# Patient Record
Sex: FEMALE | Race: BLACK OR AFRICAN AMERICAN | Employment: PART TIME | URBAN - METROPOLITAN AREA
[De-identification: names, ages, dates, MRNs, and addresses within clinical notes are randomized per-mention and may not be internally consistent; named-entity substitution may affect disease eponyms.]

---

## 2019-05-06 ENCOUNTER — APPOINTMENT (OUTPATIENT)
Dept: GENERAL RADIOLOGY | Age: 54
End: 2019-05-06
Attending: NURSE PRACTITIONER
Payer: MEDICAID

## 2019-05-06 ENCOUNTER — HOSPITAL ENCOUNTER (EMERGENCY)
Age: 54
Discharge: HOME | End: 2019-05-06
Attending: EMERGENCY MEDICINE
Payer: MEDICAID

## 2019-05-06 VITALS
HEART RATE: 84 BPM | WEIGHT: 293 LBS | BODY MASS INDEX: 53.92 KG/M2 | RESPIRATION RATE: 23 BRPM | OXYGEN SATURATION: 100 % | TEMPERATURE: 98.6 F | DIASTOLIC BLOOD PRESSURE: 81 MMHG | HEIGHT: 62 IN | SYSTOLIC BLOOD PRESSURE: 126 MMHG

## 2019-05-06 DIAGNOSIS — R07.9 CHEST PAIN, UNSPECIFIED TYPE: Primary | ICD-10-CM

## 2019-05-06 DIAGNOSIS — N30.00 ACUTE CYSTITIS WITHOUT HEMATURIA: ICD-10-CM

## 2019-05-06 DIAGNOSIS — R80.9 PROTEINURIA, UNSPECIFIED TYPE: ICD-10-CM

## 2019-05-06 LAB
ALBUMIN SERPL-MCNC: 3.6 G/DL (ref 3.4–5)
ALBUMIN/GLOB SERPL: 0.9 {RATIO} (ref 0.8–1.7)
ALP SERPL-CCNC: 101 U/L (ref 45–117)
ALT SERPL-CCNC: 29 U/L (ref 13–56)
ANION GAP SERPL CALC-SCNC: 5 MMOL/L (ref 3–18)
APPEARANCE UR: CLEAR
AST SERPL-CCNC: 21 U/L (ref 15–37)
BACTERIA URNS QL MICRO: ABNORMAL /HPF
BASOPHILS # BLD: 0 K/UL (ref 0–0.1)
BASOPHILS NFR BLD: 0 % (ref 0–2)
BILIRUB SERPL-MCNC: 0.4 MG/DL (ref 0.2–1)
BILIRUB UR QL: NEGATIVE
BUN SERPL-MCNC: 15 MG/DL (ref 7–18)
BUN/CREAT SERPL: 16 (ref 12–20)
CALCIUM SERPL-MCNC: 9.3 MG/DL (ref 8.5–10.1)
CHLORIDE SERPL-SCNC: 104 MMOL/L (ref 100–108)
CK MB CFR SERPL CALC: NORMAL % (ref 0–4)
CK MB CFR SERPL CALC: NORMAL % (ref 0–4)
CK MB SERPL-MCNC: <1 NG/ML (ref 5–25)
CK MB SERPL-MCNC: <1 NG/ML (ref 5–25)
CK SERPL-CCNC: 122 U/L (ref 26–192)
CK SERPL-CCNC: 146 U/L (ref 26–192)
CO2 SERPL-SCNC: 31 MMOL/L (ref 21–32)
COLOR UR: YELLOW
CREAT SERPL-MCNC: 0.96 MG/DL (ref 0.6–1.3)
D DIMER PPP FEU-MCNC: 0.46 UG/ML(FEU)
DIFFERENTIAL METHOD BLD: ABNORMAL
EOSINOPHIL # BLD: 0.2 K/UL (ref 0–0.4)
EOSINOPHIL NFR BLD: 2 % (ref 0–5)
EPITH CASTS URNS QL MICRO: ABNORMAL /LPF (ref 0–5)
ERYTHROCYTE [DISTWIDTH] IN BLOOD BY AUTOMATED COUNT: 15.3 % (ref 11.6–14.5)
GLOBULIN SER CALC-MCNC: 4.1 G/DL (ref 2–4)
GLUCOSE SERPL-MCNC: 86 MG/DL (ref 74–99)
GLUCOSE UR STRIP.AUTO-MCNC: NEGATIVE MG/DL
HCT VFR BLD AUTO: 42 % (ref 35–45)
HGB BLD-MCNC: 13.3 G/DL (ref 12–16)
HGB UR QL STRIP: NEGATIVE
KETONES UR QL STRIP.AUTO: NEGATIVE MG/DL
LEUKOCYTE ESTERASE UR QL STRIP.AUTO: ABNORMAL
LYMPHOCYTES # BLD: 2.1 K/UL (ref 0.9–3.6)
LYMPHOCYTES NFR BLD: 25 % (ref 21–52)
MCH RBC QN AUTO: 26.1 PG (ref 24–34)
MCHC RBC AUTO-ENTMCNC: 31.7 G/DL (ref 31–37)
MCV RBC AUTO: 82.5 FL (ref 74–97)
MONOCYTES # BLD: 0.4 K/UL (ref 0.05–1.2)
MONOCYTES NFR BLD: 5 % (ref 3–10)
NEUTS SEG # BLD: 5.8 K/UL (ref 1.8–8)
NEUTS SEG NFR BLD: 68 % (ref 40–73)
NITRITE UR QL STRIP.AUTO: NEGATIVE
PH UR STRIP: 5 [PH] (ref 5–8)
PLATELET # BLD AUTO: 290 K/UL (ref 135–420)
PMV BLD AUTO: 9.5 FL (ref 9.2–11.8)
POTASSIUM SERPL-SCNC: 3.9 MMOL/L (ref 3.5–5.5)
PROT SERPL-MCNC: 7.7 G/DL (ref 6.4–8.2)
PROT UR STRIP-MCNC: 30 MG/DL
RBC # BLD AUTO: 5.09 M/UL (ref 4.2–5.3)
RBC #/AREA URNS HPF: NEGATIVE /HPF (ref 0–5)
SODIUM SERPL-SCNC: 140 MMOL/L (ref 136–145)
SP GR UR REFRACTOMETRY: 1.02 (ref 1–1.03)
TROPONIN I SERPL-MCNC: <0.02 NG/ML (ref 0–0.04)
TROPONIN I SERPL-MCNC: <0.02 NG/ML (ref 0–0.04)
UROBILINOGEN UR QL STRIP.AUTO: 1 EU/DL (ref 0.2–1)
WBC # BLD AUTO: 8.5 K/UL (ref 4.6–13.2)
WBC URNS QL MICRO: ABNORMAL /HPF (ref 0–5)

## 2019-05-06 PROCEDURE — 99285 EMERGENCY DEPT VISIT HI MDM: CPT

## 2019-05-06 PROCEDURE — 81001 URINALYSIS AUTO W/SCOPE: CPT

## 2019-05-06 PROCEDURE — 71046 X-RAY EXAM CHEST 2 VIEWS: CPT

## 2019-05-06 PROCEDURE — 93005 ELECTROCARDIOGRAM TRACING: CPT

## 2019-05-06 PROCEDURE — 82550 ASSAY OF CK (CPK): CPT

## 2019-05-06 PROCEDURE — 85379 FIBRIN DEGRADATION QUANT: CPT

## 2019-05-06 PROCEDURE — 80053 COMPREHEN METABOLIC PANEL: CPT

## 2019-05-06 PROCEDURE — 74011250637 HC RX REV CODE- 250/637: Performed by: NURSE PRACTITIONER

## 2019-05-06 PROCEDURE — 85025 COMPLETE CBC W/AUTO DIFF WBC: CPT

## 2019-05-06 RX ORDER — NITROFURANTOIN 25; 75 MG/1; MG/1
100 CAPSULE ORAL 2 TIMES DAILY
Qty: 14 CAP | Refills: 0 | Status: SHIPPED | OUTPATIENT
Start: 2019-05-06 | End: 2019-05-13

## 2019-05-06 RX ORDER — GUAIFENESIN 100 MG/5ML
81 LIQUID (ML) ORAL
Status: COMPLETED | OUTPATIENT
Start: 2019-05-06 | End: 2019-05-06

## 2019-05-06 RX ORDER — ACETAMINOPHEN 325 MG/1
650 TABLET ORAL
Status: COMPLETED | OUTPATIENT
Start: 2019-05-06 | End: 2019-05-06

## 2019-05-06 RX ADMIN — ASPIRIN 81 MG 81 MG: 81 TABLET ORAL at 20:42

## 2019-05-06 RX ADMIN — ACETAMINOPHEN 650 MG: 325 TABLET ORAL at 20:42

## 2019-05-06 NOTE — ED PROVIDER NOTES
EMERGENCY DEPARTMENT HISTORY AND PHYSICAL EXAM 
 
Date: 5/6/2019 Patient Name: Nasrin Courtney History of Presenting Illness Chief Complaint Patient presents with  Chest Pain  Headache History Provided By: Patient Additional History (Context):  
6:33 PM 
Nasrin Courtney is a 46 y.o. female with PMHX diabetes, gout, hypertension, and sleep apnea presents to the ED c/o chest pain that started acutely around 2 PM this afternoon. Patient reports that she was walking back to her desk when she felt a sharp pain in her left chest that radiated down her left arm that lasted approximately 1 hour. She reports this pain was a 6 out of 10 and she did not take any medications prior to arrival.  She followed up with her primary care provider at 3:15 who instructed her to go to the emergency department. The patient reports that she was evaluated by a cardiologist via cardiac cath in 2015 but has not seen one since. Pt denies family history of premature cardiac death, long recent travel or hospitalization, leg pain or calf swelling, shortness of breath, family history of blood clots, personal history of blood clot, birth control, and any other sxs or complaints. PCP: Kat Ignacio MD 
 
Current Outpatient Medications Medication Sig Dispense Refill  nitrofurantoin, macrocrystal-monohydrate, (MACROBID) 100 mg capsule Take 1 Cap by mouth two (2) times a day for 7 days. 14 Cap 0  
 lisinopril-hydrochlorothiazide (PRINZIDE, ZESTORETIC) 20-25 mg per tablet TAKE ONE-HALF TABLET BY MOUTH TWICE DAILY 30 tablet 5  
 metformin (GLUCOPHAGE) 500 mg tablet Take 1 tablet by mouth two (2) times daily (with meals). 60 tablet 5  
 lovastatin (MEVACOR) 20 mg tablet Take 1 tablet by mouth nightly. 30 tablet 5  
 nystatin (MYCOSTATIN) topical cream Apply  to affected area two (2) times a day.  30 g 0  
 predniSONE (DELTASONE) 10 mg tablet Take 5 tabs by mouth daily for 5 days 25 Tab 0  
  triamcinolone acetonide (KENALOG) 0.1 % ointment Apply  to affected area two (2) times a day. use thin layer 80 g 2  
 ALBUTEROL SULFATE IN Take  by inhalation.  fluticasone (FLONASE) 50 mcg/actuation nasal spray nightly.  aspirin 81 mg tablet Take 81 mg by mouth.  multivitamin with iron tablet Take 1 Tab by mouth daily. Past History Past Medical History: 
Past Medical History:  
Diagnosis Date  Diabetes ([address]  Gout  Hypertension  Sleep apnea   
 cpap at night Past Surgical History: 
Past Surgical History:  
Procedure Laterality Date [address]  HX HEART CATHETERIZATION  2015 Family History: 
Family History Problem Relation Age of Onset  Breast Cancer Mother Social History: 
Social History Tobacco Use  Smoking status: Never Smoker  Smokeless tobacco: Never Used Substance Use Topics  Alcohol use: Yes Comment: \"rarely\"  Drug use: No  
 
 
Allergies: Allergies Allergen Reactions  Penicillins Swelling  Pollen Extracts Runny Nose Review of Systems Review of Systems Constitutional: Negative for chills and fever. HENT: Negative for sore throat. Respiratory: Negative for chest tightness and shortness of breath. Cardiovascular: Positive for chest pain. Negative for leg swelling. Gastrointestinal: Negative for abdominal pain, diarrhea, nausea and vomiting. Genitourinary: Negative for dysuria. Musculoskeletal: Negative for back pain. Skin: Negative for rash. Neurological: Negative for dizziness, syncope and numbness. Hematological: Does not bruise/bleed easily. All other systems reviewed and are negative. Physical Exam  
 
Vitals:  
 05/06/19 2000 05/06/19 2030 05/06/19 2108 05/06/19 2130 BP: 119/70 121/70 139/79 126/81 Pulse: 87 84 86 84 Resp: 17 20 23 23 Temp:  98.6 °F (37 °C) SpO2:  100% Weight:      
Height:      
 
Physical Exam  
 Constitutional: She is oriented to person, place, and time. She appears well-developed and well-nourished. Overweight -American female, no acute distress HENT:  
Head: Normocephalic and atraumatic. Eyes: Conjunctivae are normal.  
Neck: Normal range of motion. Neck supple. Cardiovascular: Normal rate and regular rhythm. Pulmonary/Chest: Effort normal and breath sounds normal. No respiratory distress. She has no wheezes. She has no rales. Abdominal: Soft. Bowel sounds are normal. There is no tenderness. There is no rebound and no guarding. Musculoskeletal: Normal range of motion. +1 bilateral lower extremity edema Neurological: She is alert and oriented to person, place, and time. Skin: Skin is warm and dry. Nursing note and vitals reviewed. Diagnostic Study Results Labs: 
  
Recent Results (from the past 12 hour(s)) EKG, 12 LEAD, INITIAL Collection Time: 05/06/19  6:11 PM  
Result Value Ref Range Ventricular Rate 81 BPM  
 Atrial Rate 81 BPM  
 P-R Interval 172 ms QRS Duration 96 ms  
 Q-T Interval 390 ms QTC Calculation (Bezet) 453 ms Calculated P Axis 56 degrees Calculated R Axis -13 degrees Calculated T Axis 61 degrees Diagnosis Normal sinus rhythm Normal ECG No previous ECGs available URINALYSIS W/ RFLX MICROSCOPIC Collection Time: 05/06/19  6:45 PM  
Result Value Ref Range Color YELLOW Appearance CLEAR Specific gravity 1.020 1.005 - 1.030    
 pH (UA) 5.0 5.0 - 8.0 Protein 30 (A) NEG mg/dL Glucose NEGATIVE  NEG mg/dL Ketone NEGATIVE  NEG mg/dL Bilirubin NEGATIVE  NEG Blood NEGATIVE  NEG Urobilinogen 1.0 0.2 - 1.0 EU/dL Nitrites NEGATIVE  NEG Leukocyte Esterase MODERATE (A) NEG URINE MICROSCOPIC ONLY Collection Time: 05/06/19  6:45 PM  
Result Value Ref Range WBC 6 to 10 0 - 5 /hpf  
 RBC NEGATIVE  0 - 5 /hpf Epithelial cells 1+ 0 - 5 /lpf  Bacteria 1+ (A) NEG /hpf  
 CBC WITH AUTOMATED DIFF Collection Time: 05/06/19  7:15 PM  
Result Value Ref Range WBC 8.5 4.6 - 13.2 K/uL  
 RBC 5.09 4. 20 - 5.30 M/uL  
 HGB 13.3 12.0 - 16.0 g/dL HCT 42.0 35.0 - 45.0 % MCV 82.5 74.0 - 97.0 FL  
 MCH 26.1 24.0 - 34.0 PG  
 MCHC 31.7 31.0 - 37.0 g/dL  
 RDW 15.3 (H) 11.6 - 14.5 % PLATELET 295 763 - 346 K/uL MPV 9.5 9.2 - 11.8 FL  
 NEUTROPHILS 68 40 - 73 % LYMPHOCYTES 25 21 - 52 % MONOCYTES 5 3 - 10 % EOSINOPHILS 2 0 - 5 % BASOPHILS 0 0 - 2 %  
 ABS. NEUTROPHILS 5.8 1.8 - 8.0 K/UL  
 ABS. LYMPHOCYTES 2.1 0.9 - 3.6 K/UL  
 ABS. MONOCYTES 0.4 0.05 - 1.2 K/UL  
 ABS. EOSINOPHILS 0.2 0.0 - 0.4 K/UL  
 ABS. BASOPHILS 0.0 0.0 - 0.1 K/UL  
 DF AUTOMATED METABOLIC PANEL, COMPREHENSIVE Collection Time: 05/06/19  7:15 PM  
Result Value Ref Range Sodium 140 136 - 145 mmol/L Potassium 3.9 3.5 - 5.5 mmol/L Chloride 104 100 - 108 mmol/L  
 CO2 31 21 - 32 mmol/L Anion gap 5 3.0 - 18 mmol/L Glucose 86 74 - 99 mg/dL BUN 15 7.0 - 18 MG/DL Creatinine 0.96 0.6 - 1.3 MG/DL  
 BUN/Creatinine ratio 16 12 - 20 GFR est AA >60 >60 ml/min/1.73m2 GFR est non-AA >60 >60 ml/min/1.73m2 Calcium 9.3 8.5 - 10.1 MG/DL Bilirubin, total 0.4 0.2 - 1.0 MG/DL  
 ALT (SGPT) 29 13 - 56 U/L  
 AST (SGOT) 21 15 - 37 U/L Alk. phosphatase 101 45 - 117 U/L Protein, total 7.7 6.4 - 8.2 g/dL Albumin 3.6 3.4 - 5.0 g/dL Globulin 4.1 (H) 2.0 - 4.0 g/dL A-G Ratio 0.9 0.8 - 1.7 CARDIAC PANEL,(CK, CKMB & TROPONIN) Collection Time: 05/06/19  7:15 PM  
Result Value Ref Range  26 - 192 U/L  
 CK - MB <1.0 <3.6 ng/ml CK-MB Index  0.0 - 4.0 % CALCULATION NOT PERFORMED WHEN RESULT IS BELOW LINEAR LIMIT Troponin-I, QT <0.02 0.0 - 0.045 NG/ML  
D DIMER Collection Time: 05/06/19  7:15 PM  
Result Value Ref Range D DIMER 0.46 (H) <0.46 ug/ml(FEU) CARDIAC PANEL,(CK, CKMB & TROPONIN)  Collection Time: 05/06/19  8:45 PM  
 Result Value Ref Range  26 - 192 U/L  
 CK - MB <1.0 <3.6 ng/ml CK-MB Index  0.0 - 4.0 % CALCULATION NOT PERFORMED WHEN RESULT IS BELOW LINEAR LIMIT Troponin-I, QT <0.02 0.0 - 0.045 NG/ML  
EKG, 12 LEAD, SUBSEQUENT Collection Time: 05/06/19  8:51 PM  
Result Value Ref Range Ventricular Rate 88 BPM  
 Atrial Rate 88 BPM  
 P-R Interval 166 ms  
 QRS Duration 98 ms Q-T Interval 396 ms QTC Calculation (Bezet) 479 ms Calculated P Axis 63 degrees Calculated R Axis -15 degrees Calculated T Axis 78 degrees Diagnosis Normal sinus rhythm Prolonged QT Abnormal ECG When compared with ECG of 06-MAY-2019 18:11, No significant change was found Radiologic Studies:  
 
6:33 PM 
RADIOLOGY FINDINGS Chest X-ray shows NAP Read by Judyth Fleming FNCLOVIS-BC Pending review by Radiologist 
 
XR CHEST PA LAT    (Results Pending) CT Results  (Last 48 hours) None CXR Results  (Last 48 hours) None Medical Decision Making I am the first provider for this patient. I reviewed the vital signs, available nursing notes, past medical history, past surgical history, family history and social history. Vital Signs: Reviewed the patient's vital signs. Pulse Oximetry Analysis: 99% on RA Cardiac Monitor: 
Rate: NSR bpm 
Rhythm: 84 EKG interpretation: (Preliminary) 6:33 PM  
Normal sinus rhythm, no STEMI, ventricular rate 81, QTc 453 EKG read by Judyth Keys FNCLOVIS-BC at 18:11 EKG interpretation repeat Normal sinus rhythm, no STEMI, ventricular rate 88, QTc 479 Read by Judyth Fleming FNP-BC 20:51 Records Reviewed: REVIEWED OLD RECORDS AND NURSING NOTES Procedures: 
Procedures ED Course: 
6:33 PM: Initial Contact 8:34 PM: Updated on all results. Urinalysis pending. She continues to deny any chest pain. Will order second set of enzymes and discharge if negative as patient does not wish to be admitted for further evaluation. 9:39 PM: Patient updated on all results. She states she feels much better and continues to not have any chest pain. She understands reasons to return does not wish for admission and is agreeable to treatment plan. Provider Notes (Medical Decision Making): She presents emergency department complaining of episode of chest pain that started acutely at 12 PM this afternoon. Age-adjusted d-dimer is negative and patient has a heart score of 3.  2 sets of cardiac enzymes are negative and EKG does not show any significant ischemia. Offered patient admission due to risk factors but she would rather follow-up with cardiology outpatient. Discharge with early PCP, cardiology follow-up and strict return precautions. Urine shows probable UTI was sent for culture we will treat with Macrobid. Pt understands reasons to return and is offering no questions or concerns. Diagnosis and Disposition Discharge Note: 
9:39 PM: 
Ronnie Tierney's  results have been reviewed with her. She has been counseled regarding her diagnosis, treatment, and plan. She verbally conveys understanding and agreement of the signs, symptoms, diagnosis, treatment and prognosis and additionally agrees to follow up as discussed. She also agrees with the care-plan and conveys that all of her questions have been answered. I have also provided discharge instructions for her that include: educational information regarding their diagnosis and treatment, and list of reasons why they would want to return to the ED prior to their follow-up appointment, should her condition change. She has been provided with education for proper emergency department utilization. Clinical Impression: 1. Chest pain, unspecified type 2. Acute cystitis without hematuria 3. Proteinuria, unspecified type Plan: 
1. D/C Home 2. Discharge Medication List as of 5/6/2019  9:39 PM  
  
START taking these medications Details nitrofurantoin, macrocrystal-monohydrate, (MACROBID) 100 mg capsule Take 1 Cap by mouth two (2) times a day for 7 days. , Print, Disp-14 Cap, R-0  
  
  
CONTINUE these medications which have NOT CHANGED Details  
lisinopril-hydrochlorothiazide (PRINZIDE, ZESTORETIC) 20-25 mg per tablet TAKE ONE-HALF TABLET BY MOUTH TWICE DAILY, Normal, Disp-30 tablet, R-5  
  
metformin (GLUCOPHAGE) 500 mg tablet Take 1 tablet by mouth two (2) times daily (with meals). , Normal, Disp-60 tablet, R-5  
  
lovastatin (MEVACOR) 20 mg tablet Take 1 tablet by mouth nightly., Normal, Disp-30 tablet, R-5  
  
nystatin (MYCOSTATIN) topical cream Apply  to affected area two (2) times a day., Normal, Disp-30 g, R-0  
  
predniSONE (DELTASONE) 10 mg tablet Take 5 tabs by mouth daily for 5 days, Normal, Disp-25 Tab, R-0  
  
triamcinolone acetonide (KENALOG) 0.1 % ointment Apply  to affected area two (2) times a day. use thin layer, Normal, Disp-80 g, R-2  
  
ALBUTEROL SULFATE IN Take  by inhalation. , Historical Med  
  
fluticasone (FLONASE) 50 mcg/actuation nasal spray nightly., Historical Med  
  
aspirin 81 mg tablet Take 81 mg by mouth., Historical Med  
  
multivitamin with iron tablet Take 1 Tab by mouth daily. , Historical Med 3. Follow-up Information Follow up With Specialties Details Why Contact Info Tiago Jarvis MD Internal Medicine Schedule an appointment as soon as possible for a visit in 1 day or your pcp [address] [place] [address] [address] [place] [address] 
[phone] Ho Sim MD Cardiology, Internal Medicine Schedule an appointment as soon as possible for a visit in 1 day  [address] [facility] [address] [facility] 
[phone] [facility] EMERGENCY DEPT Emergency Medicine  As needed, If symptoms worsen Susu Ordonez [facility] [address] 
[phone] Please note that this dictation was completed with [email or web address], the [employer] voice recognition software. Quite often unanticipated grammatical, syntax, homophones, and other interpretive errors are inadvertently transcribed by the computer software. Please disregard these errors. Please excuse any errors that have escaped final proofreading.  
 
MARCY Goel-BC

## 2019-05-06 NOTE — ED TRIAGE NOTES
Patient ambulatory into ER c/o chest discomfort (described as a dull ache) that began at 1230 today. Pt reports a headache began shortly after the chest pain began.

## 2019-05-07 NOTE — DISCHARGE INSTRUCTIONS
Follow-up as directed  Take medications as prescribed  Increase oral fluid intake  Return to emergency department for increased pain, shortness of breath, dizziness, sweating, vomiting or worsening of symptoms    Patient Education        Chest Pain: Care Instructions  Your Care Instructions    There are many things that can cause chest pain. Some are not serious and will get better on their own in a few days. But some kinds of chest pain need more testing and treatment. Your doctor may have recommended a follow-up visit in the next 8 to 12 hours. If you are not getting better, you may need more tests or treatment. Even though your doctor has released you, you still need to watch for any problems. The doctor carefully checked you, but sometimes problems can develop later. If you have new symptoms or if your symptoms do not get better, get medical care right away. If you have worse or different chest pain or pressure that lasts more than 5 minutes or you passed out (lost consciousness), call 911 or seek other emergency help right away. A medical visit is only one step in your treatment. Even if you feel better, you still need to do what your doctor recommends, such as going to all suggested follow-up appointments and taking medicines exactly as directed. This will help you recover and help prevent future problems. How can you care for yourself at home? · Rest until you feel better. · Take your medicine exactly as prescribed. Call your doctor if you think you are having a problem with your medicine. · Do not drive after taking a prescription pain medicine. When should you call for help? Call 911 if:    · You passed out (lost consciousness).     · You have severe difficulty breathing.     · You have symptoms of a heart attack. These may include:  ? Chest pain or pressure, or a strange feeling in your chest.  ? Sweating. ? Shortness of breath. ? Nausea or vomiting.   ? Pain, pressure, or a strange feeling in your back, neck, jaw, or upper belly or in one or both shoulders or arms. ? Lightheadedness or sudden weakness. ? A fast or irregular heartbeat. After you call 911, the  may tell you to chew 1 adult-strength or 2 to 4 low-dose aspirin. Wait for an ambulance. Do not try to drive yourself.    Call your doctor today if:    · You have any trouble breathing.     · Your chest pain gets worse.     · You are dizzy or lightheaded, or you feel like you may faint.     · You are not getting better as expected.     · You are having new or different chest pain. Where can you learn more? Go to http://mackenzieViOptixkelli.info/. Enter A120 in the search box to learn more about \"Chest Pain: Care Instructions. \"  Current as of: September 23, 2018  Content Version: 11.9  © 1558-1724 [employer]. Care instructions adapted under license by [employer] (which disclaims liability or warranty for this information). If you have questions about a medical condition or this instruction, always ask your healthcare professional. [place] [address] any warranty or liability for your use of this information. Patient Education        Urinary Tract Infection in Women: Care Instructions  Your Care Instructions    A urinary tract infection, or UTI, is a general term for an infection anywhere between the kidneys and the urethra (where urine comes out). Most UTIs are bladder infections. They often cause pain or burning when you urinate. UTIs are caused by bacteria and can be cured with antibiotics. Be sure to complete your treatment so that the infection goes away. Follow-up care is a key part of your treatment and safety. Be sure to make and go to all appointments, and call your doctor if you are having problems. It's also a good idea to know your test results and keep a list of the medicines you take. How can you care for yourself at home? · Take your antibiotics as directed. Do not stop taking them just because you feel better. You need to take the full course of antibiotics. · Drink extra water and other fluids for the next day or two. This may help wash out the bacteria that are causing the infection. (If you have kidney, heart, or liver disease and have to limit fluids, talk with your doctor before you increase your fluid intake.)  · Avoid drinks that are carbonated or have caffeine. They can irritate the bladder. · Urinate often. Try to empty your bladder each time. · To relieve pain, take a hot bath or lay a heating pad set on low over your lower belly or genital area. Never go to sleep with a heating pad in place. To prevent UTIs  · Drink plenty of water each day. This helps you urinate often, which clears bacteria from your system. (If you have kidney, heart, or liver disease and have to limit fluids, talk with your doctor before you increase your fluid intake.)  · Urinate when you need to. · Urinate right after you have sex. · Change sanitary pads often. · Avoid douches, bubble baths, feminine hygiene sprays, and other feminine hygiene products that have deodorants. · After going to the bathroom, wipe from front to back. When should you call for help? Call your doctor now or seek immediate medical care if:    · Symptoms such as fever, chills, nausea, or vomiting get worse or appear for the first time.     · You have new pain in your back just below your rib cage. This is called flank pain.     · There is new blood or pus in your urine.     · You have any problems with your antibiotic medicine.    Watch closely for changes in your health, and be sure to contact your doctor if:    · You are not getting better after taking an antibiotic for 2 days.     · Your symptoms go away but then come back. Where can you learn more? Go to http://macknezie-kelli.info/.   Enter C554 in the search box to learn more about \"Urinary Tract Infection in Women: Care Instructions. \"  Current as of: March 20, 2018  Content Version: 11.9  © 1498-7237 [employer], [facility]. Care instructions adapted under license by [employer] (which disclaims liability or warranty for this information). If you have questions about a medical condition or this instruction, always ask your healthcare professional. [place] [address] any warranty or liability for your use of this information.

## 2019-05-08 ENCOUNTER — HOSPITAL ENCOUNTER (OUTPATIENT)
Dept: ULTRASOUND IMAGING | Age: 54
Discharge: HOME | End: 2019-05-08
Attending: NURSE PRACTITIONER
Payer: MEDICAID

## 2019-05-08 DIAGNOSIS — K42.9 UMBILICAL HERNIA: ICD-10-CM

## 2019-05-08 DIAGNOSIS — R10.815 PERIUMBILIC ABDOMINAL TENDERNESS: ICD-10-CM

## 2019-05-08 PROCEDURE — 76700 US EXAM ABDOM COMPLETE: CPT

## 2019-05-11 LAB
ATRIAL RATE: 81 BPM
ATRIAL RATE: 88 BPM
CALCULATED P AXIS, ECG09: 56 DEGREES
CALCULATED P AXIS, ECG09: 63 DEGREES
CALCULATED R AXIS, ECG10: -13 DEGREES
CALCULATED R AXIS, ECG10: -15 DEGREES
CALCULATED T AXIS, ECG11: 61 DEGREES
CALCULATED T AXIS, ECG11: 78 DEGREES
DIAGNOSIS, 93000: NORMAL
DIAGNOSIS, 93000: NORMAL
P-R INTERVAL, ECG05: 166 MS
P-R INTERVAL, ECG05: 172 MS
Q-T INTERVAL, ECG07: 390 MS
Q-T INTERVAL, ECG07: 396 MS
QRS DURATION, ECG06: 96 MS
QRS DURATION, ECG06: 98 MS
QTC CALCULATION (BEZET), ECG08: 453 MS
QTC CALCULATION (BEZET), ECG08: 479 MS
VENTRICULAR RATE, ECG03: 81 BPM
VENTRICULAR RATE, ECG03: 88 BPM

## 2022-04-13 ENCOUNTER — HOSPITAL ENCOUNTER (OUTPATIENT)
Dept: NUTRITION | Age: 57
End: 2022-04-13
Payer: MEDICAID

## 2022-04-14 ENCOUNTER — APPOINTMENT (OUTPATIENT)
Dept: NUTRITION | Age: 57
End: 2022-04-14
Payer: MEDICAID

## 2022-04-20 ENCOUNTER — APPOINTMENT (OUTPATIENT)
Dept: NUTRITION | Age: 57
End: 2022-04-20
Payer: MEDICAID

## 2022-04-27 ENCOUNTER — HOSPITAL ENCOUNTER (OUTPATIENT)
Dept: NUTRITION | Age: 57
Discharge: HOME | End: 2022-04-27
Payer: MEDICAID

## 2022-04-27 PROCEDURE — 97802 MEDICAL NUTRITION INDIV IN: CPT

## 2022-04-27 NOTE — PROGRESS NOTES
[address] [place] [address]  [address] [facility], [address] [facility]  Phone: [phone] Fax: [phone]   Nutrition Assessment - Medical Nutrition Therapy   Outpatient Initial Evaluation         Patient Name: Cassius Arreaga : 1965   Treatment Diagnosis: Morbid (severe) obesity due to excess calories, Body mass index (BMI) 50.0-59.9, adult   Referral Source: Ana Staples NP Start of Care [facility]): 2022     Gender: female Age: 61 y.o. Ht: 62 in Wt: 320  lb  kg   BMI: 58.5 BMR   Male  BMR Female      Past Medical History:  Diabetes, Depression, High Blood Pressure, Stroke, Constipation      Pertinent Medications:   Includes: Metformin, Lisinopril, Carvedilol, Atorvastatin, Vitamin D, Calcium, Aspirin      Biochemical Data:        Assessment:    Patient present to learn about nutrition related to weight loss and diabetes. Patient reports that she has been gaining weight for the past 2 years. Patient does not check blood sugars at home. Patient works 2-3 days per week. Patient eats most meals outside of the home and does not enjoy cooking. Patient attends PT 2 days per week. Patient is unable to recall most recent A1-C. Food & Nutrition: 24 hour recall:  Breakfast: PBJ sandwich, banana  Lunch: turkey sandwich- Edmund Johns   Dinner: pasta with [employer] form [employer]   Drinks: [employer] zero, diet soda   Snacks: chips, nuts, ice cream        Estimate Needs   Calories:  2130 Protein: 133 Carbs: 240 Fat: 71   Kcal/day  g/day  g/day  g/day        percent: 25  45  30               Nutrition Diagnosis   Obesity related to excessive carbohydrate intake as evidenced by 24 hour recall of carbohydrate dense food (large sandwich roll, ice cream, pasta- large portion). Nutrition Intervention &  Education: Educated patient on the need for a consistent carbohydrate intake related to diabetic control and weight loss.  Educated patient on nutrition label reading, emphasizing carbohydrates and serving size. Educated patient on protein sources, encouraged patient to incorporate mostly lean protein source with all carbohydrates. Encouraged patient to exercise after meals when possible. Handouts Provided: [x]  Carbohydrates  [x]  Protein  [x]  Non-starchy Vegatbles  [x]  Food Label  [x]  Meal and Snack Ideas  []  Food Journals [x]  Diabetes  []  Cholesterol  []  Sodium  []  Gen Nutr Guidelines  []  SBGM Guidelines  []  Others:   Information Reviewed with: Patient    Readiness to Change Stage: [x]  Pre-contemplative    []  Contemplative  []  Preparation               []  Action                  []  Maintenance   Potential Barriers to Learning: []  Decline in memory    []  Language barrier   []  Other:  []  Emotional                  []  Limited mobility  [x]  Lack of motivation     [] Vision, hearing or cognitive impairment   Expected Compliance: Fair      Nutritional Goal - To promote lifestyle changes to result in:    [x]  Weight loss  [x]  Improved diabetic control  []  Decreased cholesterol levels  []  Decreased blood pressure  []  Weight maintenance []  Preventing any interactions associated with food allergies  []  Adequate weight gain toward goal weight  []  Other:        Patient Goals:   1. Patient will consume three meals per day 4-5 hours apart. 2. Patient will include a protein at all meals and snacks. 3. Patient will drink 64 ounces of water daily.       Dietitian Signature: Milly Mccain RD Date: 4/27/2022   Follow-up:  Time: 12:39 PM

## 2023-05-21 RX ORDER — LOVASTATIN 20 MG/1
1 TABLET ORAL NIGHTLY
COMMUNITY
Start: 2014-10-02

## 2023-05-21 RX ORDER — PREDNISONE 10 MG/1
TABLET ORAL
COMMUNITY
Start: 2014-07-08

## 2023-05-21 RX ORDER — FLUTICASONE PROPIONATE 50 MCG
SPRAY, SUSPENSION (ML) NASAL
COMMUNITY

## 2023-05-21 RX ORDER — NYSTATIN 100000 U/G
CREAM TOPICAL 2 TIMES DAILY
COMMUNITY
Start: 2014-07-08

## 2023-05-21 RX ORDER — LISINOPRIL AND HYDROCHLOROTHIAZIDE 25; 20 MG/1; MG/1
TABLET ORAL
COMMUNITY
Start: 2014-10-02